# Patient Record
Sex: FEMALE | Race: BLACK OR AFRICAN AMERICAN | NOT HISPANIC OR LATINO
[De-identification: names, ages, dates, MRNs, and addresses within clinical notes are randomized per-mention and may not be internally consistent; named-entity substitution may affect disease eponyms.]

---

## 2023-01-25 PROBLEM — Z00.00 ENCOUNTER FOR PREVENTIVE HEALTH EXAMINATION: Status: ACTIVE | Noted: 2023-01-25

## 2023-01-31 ENCOUNTER — APPOINTMENT (OUTPATIENT)
Dept: OTOLARYNGOLOGY | Facility: CLINIC | Age: 28
End: 2023-01-31
Payer: COMMERCIAL

## 2023-01-31 ENCOUNTER — NON-APPOINTMENT (OUTPATIENT)
Age: 28
End: 2023-01-31

## 2023-01-31 VITALS
WEIGHT: 140 LBS | SYSTOLIC BLOOD PRESSURE: 111 MMHG | DIASTOLIC BLOOD PRESSURE: 71 MMHG | HEART RATE: 76 BPM | BODY MASS INDEX: 26.43 KG/M2 | HEIGHT: 61 IN | TEMPERATURE: 98.2 F

## 2023-01-31 DIAGNOSIS — Z78.9 OTHER SPECIFIED HEALTH STATUS: ICD-10-CM

## 2023-01-31 PROCEDURE — 31575 DIAGNOSTIC LARYNGOSCOPY: CPT

## 2023-01-31 PROCEDURE — 99204 OFFICE O/P NEW MOD 45 MIN: CPT | Mod: 25

## 2023-01-31 NOTE — DATA REVIEWED
[de-identified] : -\par CT Neck w/ Contrast 11/8/21\par  IMPRESSION:\par Large, symmetric and enhancing parotid & submandibular glands perhaps reflective of an autoimmune phenomenon and clinical correlation advise. Several moderately prominent cervical lymph node are also identified. Soft tissue prominence in nasopharynx and along tonsillar pillars also likely due to associated lymphoid hypertrophy.

## 2023-01-31 NOTE — PROCEDURE
[de-identified] : -\par Laryngoscopy: Flexible Laryngoscopy - Procedure Note\par \par Pre-operative Diagnosis: Salivary gland swelling\par Post-operative Diagnosis: normal exam \par Anesthesia: Topical - 1 % Lidocaine/Phenylephrine\par Procedure: Flexible Laryngoscopy\par \par Procedure Details: \par The patient was placed in the sitting position. After decongestant and anesthesia were applied the laryngoscope was passed. The nasal cavities, nasopharynx, oropharynx, hypopharynx, and larynx were all examined. Vocal folds were examined during respiration and phonation. The following findings were noted:\par \par Findings: \par Nose: Septum is midline, turbinates are normal, nasal airways patent, mucosa normal\par Nasopharynx: Adenoids normal, no masses, eustachian tube normal\par Oropharynx: Pharyngeal walls symmetric and without lesion. Tonsils/fossae symmetric and normal.\par Hypopharynx: Hypopharynx and pyriform sinuses without lesion. No masses or asymmetry. No pooling of secretions.\par Larynx: Epiglottis and aryepiglottic folds were sharp and crisp bilaterally. Bilateral false and true vocal folds normal appearance. Bilateral vocal folds fully mobile and symmetric. Airway was widely patent. \par \par Condition: Stable. Patient tolerated procedure well.\par \par Complications: None\par \par

## 2023-01-31 NOTE — ASSESSMENT
[FreeTextEntry1] : 28-year-old female who presents with concern for bilateral neck and mandibular swelling.  Patient previously worked up and diagnosed with sialoadenitis.  She notes that her symptoms have persisted and have been bothersome.  Previously has been worked up by both ENT and rheumatology without definitive diagnosis.  Exam today is essentially normal.  At this time I have asked the patient to send me previous CT imaging as well as rheumatologic notes and lab work.  At this time I am recommending an ultrasound of the neck for further evaluation as well as conservative treatment for sialoadenitis.  Sial adenitis handout given.  Patient to follow-up in 3 weeks for repeat evaluation\par \par – Sialadenitis handout given\par – Ultrasound neck with attention to bilateral salivary glands\par – Patient to have previous work-up sent to me for evaluation\par – Follow-up 3 weeks, sooner should symptoms worsen or fail to improve\par –consider lip biopsy

## 2023-03-18 ENCOUNTER — OUTPATIENT (OUTPATIENT)
Dept: OUTPATIENT SERVICES | Facility: HOSPITAL | Age: 28
LOS: 1 days | End: 2023-03-18

## 2023-03-18 ENCOUNTER — APPOINTMENT (OUTPATIENT)
Dept: ULTRASOUND IMAGING | Facility: CLINIC | Age: 28
End: 2023-03-18
Payer: COMMERCIAL

## 2023-03-18 PROCEDURE — 76536 US EXAM OF HEAD AND NECK: CPT | Mod: 26

## 2023-03-19 ENCOUNTER — TRANSCRIPTION ENCOUNTER (OUTPATIENT)
Age: 28
End: 2023-03-19

## 2023-03-24 ENCOUNTER — APPOINTMENT (OUTPATIENT)
Dept: OTOLARYNGOLOGY | Facility: CLINIC | Age: 28
End: 2023-03-24
Payer: COMMERCIAL

## 2023-03-24 PROCEDURE — 99214 OFFICE O/P EST MOD 30 MIN: CPT | Mod: 95

## 2023-03-31 NOTE — REASON FOR VISIT
[Home] : at home, [unfilled] , at the time of the visit. [Medical Office: ([facility])___] : at the medical office located in  [Patient] : the patient

## 2023-03-31 NOTE — ASSESSMENT
[FreeTextEntry1] : 28-year-old female who presents with concern for bilateral neck and mandibular swelling.  Patient previously worked up and diagnosed with sialoadenitis.  She notes that her symptoms have persisted and have been bothersome.  Previously has been worked up by both ENT and rheumatology without definitive diagnosis.  Exam today is essentially normal.  At this time I have asked the patient to send me previous CT imaging as well as rheumatologic notes and lab work.  At this time I am recommending an ultrasound of the neck for further evaluation as well as conservative treatment for sialoadenitis.  Sial adenitis handout given.  Patient to follow-up in 3 weeks for repeat evaluation.\par \par   3/24/2023: No significant changes.  Ultrasound neck completed without evidence of salivary stones.  Salivary glands themselves had heterogeneity.   at this point patient knows to send me work-up from allergist with further blood work.  Subsequently will likely need either salivary gland biopsy versus sialendoscopy with injection of steroid\par \par –  ultrasound reviewed\par – Patient have previous blood work and rheumatologic work-up sent to me for review\par – Consider tissue/salivary gland biopsy\par – Consider sialendoscopy with injection of steroids\par – Follow-up 1 month, sooner if symptoms worsen or fail to improve

## 2023-03-31 NOTE — HISTORY OF PRESENT ILLNESS
[de-identified] : 1/31/23\par 28F presents with bilateral neck/jaw swelling since 2021. She reports that when she eats and/or is stressed, her "glands swell up." Symptoms occur every day. Swelling can last 20-30 mins, massage and hot tea help. Associated symptoms nasal congestion. She is staying away from dairy and antihistamines because she believes they trigger this. Denies pain. Difficulty chewing due to "tightness." + dry mouth. Also reports some difficulty swallowing with these episodes. No breathing issues. No voice changes. No fevers, night sweats, weight loss or referred otalgia. \par \par Seen by ENT (last time was 2 months ago), rheum with blood work negative for rheumatologic diseases per pt. \par \par Had CT Scan done 11/2021 which was normal per pt. \par - [FreeTextEntry1] : 3/24/23\par No significant changes in symptoms.  Symptomatology including neck swelling has included especially with eating.  Patient did complete ultrasound of the neck and presents to review those results.  Patient is also been seen by an allergist and further blood work is pending.  No new ear, nose, throat symptoms otherwise.

## 2023-04-06 ENCOUNTER — APPOINTMENT (OUTPATIENT)
Dept: OTOLARYNGOLOGY | Facility: CLINIC | Age: 28
End: 2023-04-06

## 2023-04-07 ENCOUNTER — APPOINTMENT (OUTPATIENT)
Dept: OTOLARYNGOLOGY | Facility: CLINIC | Age: 28
End: 2023-04-07

## 2023-04-25 ENCOUNTER — APPOINTMENT (OUTPATIENT)
Dept: OTOLARYNGOLOGY | Facility: CLINIC | Age: 28
End: 2023-04-25
Payer: COMMERCIAL

## 2023-04-25 VITALS
BODY MASS INDEX: 26.43 KG/M2 | SYSTOLIC BLOOD PRESSURE: 110 MMHG | HEIGHT: 61 IN | HEART RATE: 70 BPM | TEMPERATURE: 98 F | DIASTOLIC BLOOD PRESSURE: 70 MMHG | WEIGHT: 140 LBS | OXYGEN SATURATION: 99 %

## 2023-04-25 PROCEDURE — 99214 OFFICE O/P EST MOD 30 MIN: CPT

## 2023-04-26 NOTE — HISTORY OF PRESENT ILLNESS
[de-identified] : 1/31/23\par 28F presents with bilateral neck/jaw swelling since 2021. She reports that when she eats and/or is stressed, her "glands swell up." Symptoms occur every day. Swelling can last 20-30 mins, massage and hot tea help. Associated symptoms nasal congestion. She is staying away from dairy and antihistamines because she believes they trigger this. Denies pain. Difficulty chewing due to "tightness." + dry mouth. Also reports some difficulty swallowing with these episodes. No breathing issues. No voice changes. No fevers, night sweats, weight loss or referred otalgia. \par \par Seen by ENT (last time was 2 months ago), rheum with blood work negative for rheumatologic diseases per pt. \par \par Had CT Scan done 11/2021 which was normal per pt. \par - \par Interval History: 3/24/23\par No significant changes in symptoms. Symptomatology including neck swelling has included especially with eating. Patient did complete ultrasound of the neck and presents to review those results. Patient is also been seen by an allergist and further blood work is pending. No new ear, nose, throat symptoms otherwise. \par - [FreeTextEntry1] : 4/25/23\par No changes in symptoms. Presents for 1 mo fu. No new ENT issues otherwise. Was seen by Allergist and work up essentially negative, same with rheumatologist.

## 2023-04-26 NOTE — ASSESSMENT
[FreeTextEntry1] : 28-year-old female who presents with concern for bilateral neck and mandibular swelling. Patient previously worked up and diagnosed with sialoadenitis. She notes that her symptoms have persisted and have been bothersome. Previously has been worked up by both ENT and rheumatology without definitive diagnosis. Exam today is essentially normal. At this time I have asked the patient to send me previous CT imaging as well as rheumatologic notes and lab work. At this time I am recommending an ultrasound of the neck for further evaluation as well as conservative treatment for sialoadenitis. Sial adenitis handout given. Patient to follow-up in 3 weeks for repeat evaluation.\par \par  3/24/2023: No significant changes. Ultrasound neck completed without evidence of salivary stones. Salivary glands themselves had heterogeneity. at this point patient knows to send me work-up from allergist with further blood work. Subsequently will likely need either salivary gland biopsy versus sialendoscopy with injection of steroid\par \par 4/25/23:  Today, patient reports no changes in symptoms. Exam unchanged. I am recommending referral to Dr. Randolph for further evaluation and management including potential sialendoscopy with steroid injection, or potential biopsy. \par \par – Patient have previous blood work and rheumatologic work-up sent to me for review\par -  Dr. Randolph referral for further evaluation

## 2023-05-26 ENCOUNTER — APPOINTMENT (OUTPATIENT)
Dept: OTOLARYNGOLOGY | Facility: CLINIC | Age: 28
End: 2023-05-26
Payer: COMMERCIAL

## 2023-05-26 VITALS
BODY MASS INDEX: 28.25 KG/M2 | WEIGHT: 149.6 LBS | HEIGHT: 61 IN | DIASTOLIC BLOOD PRESSURE: 82 MMHG | HEART RATE: 75 BPM | SYSTOLIC BLOOD PRESSURE: 117 MMHG | TEMPERATURE: 95.3 F

## 2023-05-26 DIAGNOSIS — R60.0 LOCALIZED EDEMA: ICD-10-CM

## 2023-05-26 PROCEDURE — 99214 OFFICE O/P EST MOD 30 MIN: CPT

## 2023-05-29 NOTE — ASSESSMENT
[FreeTextEntry1] : Ms. SORIA is a 28 year old woman who has chronic submandibular swelling and sialadenitis starting in 2021.\par Rheumatologic w/up in 12/2022 was negative but IgG subclasses are high normal or elevated.\par SSA/SSB equivocal,\par \par PLAN:\par lip biopsy\par Rheum consult\par Primary Care provider needed\par Sialoendoscopy will not be done at this time.\par \par

## 2023-05-29 NOTE — PHYSICAL EXAM
[Midline] : trachea located in midline position [Normal] : cranial nerves 2-12 intact [FreeTextEntry1] : No hoarseness.  [de-identified] : Enlarged, firm, NT submandibular glands bilateral. [de-identified] : Thyroid gland normal size, no palpable nodules.  [de-identified] : Dry mucosa diffuse.  No salive seen expressed in FOM. [de-identified] : Bilateral parotid glands are normal.

## 2023-05-29 NOTE — HISTORY OF PRESENT ILLNESS
[de-identified] : Ms. SORIA is a 28 year old woman who was referred by Dr. Abhilash Ch for sialoadenitis.\par \par She has bilateral neck/jaw swelling since 2021. When she eats or is stressed, her "glands swell up." Symptoms occur every day. Swelling can last 20-30 minutes; massage and hot tea help. Glands are always a little enlarged.  She gets difficulty chewing due to tightness and dry mouth.  Also reports some difficulty swallowing with these episodes. Gets dry mouth often.\par Sx started after taking Benadryl for suspected allergic reaction.\par In April 2023 she was eating salmon w/ wine sauce and ithen felt tightness and tingling in her right face which lasted 25-30 minutes.\par She saw a rheumatologist in Dec 2022, after which she was told that workup was negative.\par Gets nasal congestion.\par \par  \par CT SOFT TISSUE NECK WITH CONTRAST (11-) at [facility]\par - COMPARISON:  None\par * Cursory evaluation of the intracranial compartment reveals nothing abnormal. The fourth ventricle is midline. The cranial base intact. There is some soft tissue prominence in the nasopharynx presumably benign lymphoid hypertrophy. There is a calcification in this location as well of questionable significance. Pterygopalatine fossae are clear. The parotid and submandibular glands are unusually large and denser/more enhancing than we typically see. There is no distinct evidence of intrinsic mass lesion or associated calculi. Clinical correlation is advised regarding the possibility of an underlying autoimmune phenomenon.\par * The glottis and supraglottic region is clear. The infraglottic region, thyroid gland and upper mediastinum are unremarkable. The supraclavicular region and lung apex are unremarkable as well.\par * A few moderately prominent lymph nodes are identified in the anterior and posterior cervical spaces bilaterally. None of these are clearly above the threshold size to be considered distinctly abnormal. The largest of these are at level 1B\par IMPRESSION: \par  Large, symmetric and enhancing parotid and submandibular glands perhaps reflective of an autoimmune phenomenon and clinical correlation is advised. Several moderately prominent cervical lymph nodes are also identified. Soft tissue prominence in nasopharynx and along the tonsillar pillars also likely due to associated lymphoid hypertrophy\par (Images were reviewed.) \par \par \par US HEAD NECK SOFT TISSUE 03/18/2023 at [facility]\par - Prior study: None.\par * Right parotid gland measures 5.6 x 2.2 x 2.8 cm, homogeneous, no focal lesions. Subcentimeter benign-appearing lymph node at the lower pole.\par * Left parotid gland measures 5.9 x 1.8 x 2.5 cm, homogeneous. Subcentimeter benign-appearing lymph node at the lower pole.\par *  Right submandibular gland measures 4.3 x 1.2 x 3.3 cm, heterogeneous, normal vascularity.\par *  Left submandibular gland measures 4.9 x 1.4 x 3.2 cm, heterogeneous, normal vascularity.\par *  Bilateral subcentimeter morphologically normal mostly level 2 lymph nodes, likely reactive.\par *  No additional cystic or solid neck lesions.\par IMPRESSION:\par - Mildly heterogeneous submandibular glands, significance unclear, may be further evaluated with contrast-enhanced neck CT.\par - No suspicious lymphadenopathy or sonographically visible neck masses.\par \par \par LABS\par (10/12/2022) - C3 146 (NL), C4 32 (NL), RF  15, ANDRAE IgM/IgG NL, CRP 1.4, anti-DNA DS <1, Smith Ab <0.2, RNP Ab <0.2, SSA 0.2, SSB <0.2, ACE 35, IgG overall and subclass 1 elevated ; subclasses 2-4 high normal \par

## 2023-06-20 ENCOUNTER — APPOINTMENT (OUTPATIENT)
Dept: INTERNAL MEDICINE | Facility: CLINIC | Age: 28
End: 2023-06-20

## 2023-07-06 ENCOUNTER — APPOINTMENT (OUTPATIENT)
Dept: OTOLARYNGOLOGY | Facility: CLINIC | Age: 28
End: 2023-07-06
Payer: COMMERCIAL

## 2023-07-06 VITALS
BODY MASS INDEX: 28.13 KG/M2 | HEIGHT: 61 IN | HEART RATE: 76 BPM | SYSTOLIC BLOOD PRESSURE: 112 MMHG | WEIGHT: 149 LBS | DIASTOLIC BLOOD PRESSURE: 80 MMHG

## 2023-07-06 DIAGNOSIS — R09.81 NASAL CONGESTION: ICD-10-CM

## 2023-07-06 DIAGNOSIS — Z87.898 PERSONAL HISTORY OF OTHER SPECIFIED CONDITIONS: ICD-10-CM

## 2023-07-06 DIAGNOSIS — J34.3 HYPERTROPHY OF NASAL TURBINATES: ICD-10-CM

## 2023-07-06 DIAGNOSIS — K11.20 SIALOADENITIS, UNSPECIFIED: ICD-10-CM

## 2023-07-06 DIAGNOSIS — K11.7 DISTURBANCES OF SALIVARY SECRETION: ICD-10-CM

## 2023-07-06 PROCEDURE — 40812 EXCISE/REPAIR MOUTH LESION: CPT

## 2023-07-06 PROCEDURE — 99213 OFFICE O/P EST LOW 20 MIN: CPT | Mod: 25

## 2023-07-06 NOTE — PHYSICAL EXAM
[de-identified] : Mildly enlarged, firm, NT submandibular glands bilateral. [de-identified] : Thyroid gland normal size, no palpable nodules.  [de-identified] : inferior turbinate hypertrophy bilateral  [de-identified] : Mildly dry mucosa mouth/throat. [Normal] : no neck adenopathy

## 2023-07-06 NOTE — PROCEDURE
[FreeTextEntry1] : Biopsy of lower lip vestibule for minor salivary gland. [FreeTextEntry2] : Chronic dry mouth and sialadenitis - r/o Sjogren's, rheumatologic disease [FreeTextEntry3] : Anesthesia:  Local\par EBL:  minimal\par Complications:  None\par \par INDICATIONS:  Ms. SORIA has chronic dry eyes and mouth , with blood tests equivocal for Sjogren syndrome.   I recommended lower lip minor salivary gland biopsy.  Risks, benefits, and alternatives were discussed with the patient who agreed to proceed.  Risks include, but are not limited to, bleeding, infection, sialadenitis, mucocele and scar.\par \par PROCEDURE: After informed consent was obtained, the right lower lip mucosal area was injected with 1% lidocaine with 1:100,000 epinephrine for anesthesia and vasoconstriction. Once the area was completely anesthetized, an incision was made through the mucosa.  Dissection in the submucosa revealed a only 1 minor salivary glands, which was removed.  There was minimal bleeding, controlled with pressure. The incision was closed with 4-0 chromic interrupted simple sutures.  The specimen was placed in formalin for Pathology.\par The patient tolerated the procedure well\par

## 2023-07-06 NOTE — HISTORY OF PRESENT ILLNESS
[de-identified] : Ms. SORIA presents for minor salivary gland biopsy.\par She still gets dry mouth and submandibular gland swelling which contributes to her dysphagia. \par She has always had nasal congestion. She  used oxymetazoline nasal spray x the past 3-4 days with improvement in congestion.  She feels her swallowing is better when she is not congested. Occasionally she has facial pressure below/above her eyes.  No runny nose or postnasal drip. \par \par Ms. SORIA is a 28 year old woman who was referred by Dr. Abhilash Ch for sialoadenitis.\par \par She has bilateral neck/jaw swelling since 2021. When she eats or is stressed, her "glands swell up." Symptoms occur every day. Swelling can last 20-30 minutes; massage and hot tea help. Glands are always a little enlarged. She gets difficulty chewing due to tightness and dry mouth. Also reports some difficulty swallowing with these episodes. Gets dry mouth often.\par Sx started after taking Benadryl for suspected allergic reaction.\par In April 2023 she was eating salmon w/ wine sauce and ithen felt tightness and tingling in her right face which lasted 25-30 minutes.\par She saw a rheumatologist in Dec 2022, after which she was told that workup was negative.\par Gets nasal congestion.\par \par  \par CT SOFT TISSUE NECK WITH CONTRAST (11-) at [facility]\par - COMPARISON: None\par * Cursory evaluation of the intracranial compartment reveals nothing abnormal. The fourth ventricle is midline. The cranial base intact. There is some soft tissue prominence in the nasopharynx presumably benign lymphoid hypertrophy. There is a calcification in this location as well of questionable significance. Pterygopalatine fossae are clear. The parotid and submandibular glands are unusually large and denser/more enhancing than we typically see. There is no distinct evidence of intrinsic mass lesion or associated calculi. Clinical correlation is advised regarding the possibility of an underlying autoimmune phenomenon.\par * The glottis and supraglottic region is clear. The infraglottic region, thyroid gland and upper mediastinum are unremarkable. The supraclavicular region and lung apex are unremarkable as well.\par * A few moderately prominent lymph nodes are identified in the anterior and posterior cervical spaces bilaterally. None of these are clearly above the threshold size to be considered distinctly abnormal. The largest of these are at level 1B\par IMPRESSION: \par  Large, symmetric and enhancing parotid and submandibular glands perhaps reflective of an autoimmune phenomenon and clinical correlation is advised. Several moderately prominent cervical lymph nodes are also identified. Soft tissue prominence in nasopharynx and along the tonsillar pillars also likely due to associated lymphoid hypertrophy\par \par \par US HEAD NECK SOFT TISSUE 03/18/2023 at [facility]\par - Prior study: None.\par * Right parotid gland measures 5.6 x 2.2 x 2.8 cm, homogeneous, no focal lesions. Subcentimeter benign-appearing lymph node at the lower pole.\par * Left parotid gland measures 5.9 x 1.8 x 2.5 cm, homogeneous. Subcentimeter benign-appearing lymph node at the lower pole.\par * Right submandibular gland measures 4.3 x 1.2 x 3.3 cm, heterogeneous, normal vascularity.\par * Left submandibular gland measures 4.9 x 1.4 x 3.2 cm, heterogeneous, normal vascularity.\par * Bilateral subcentimeter morphologically normal mostly level 2 lymph nodes, likely reactive.\par * No additional cystic or solid neck lesions.\par IMPRESSION:\par - Mildly heterogeneous submandibular glands, significance unclear, may be further evaluated with contrast-enhanced neck CT.\par - No suspicious lymphadenopathy or sonographically visible neck masses.\par \par \par LABS\par (10/12/2022) - C3 146 (NL), C4 32 (NL), RF 15, ANDRAE IgM/IgG NL, CRP 1.4, anti-DNA DS <1, Smith Ab <0.2, RNP Ab <0.2, SSA 0.2, SSB <0.2, ACE 35, IgG overall and subclass 1 elevated ; subclasses 2-4 high normal \par \par

## 2023-07-06 NOTE — ASSESSMENT
[FreeTextEntry1] : Ms. SORIA has chronic dry mouth and eyes, with submandibular swelling and sialadenitis starting in 2021.\par SSA/SSB equivocal results. Lower lip biopsy was performed today.\par \par Chronic nasal congstion due to inferior turbinate hypertrophy; relieved with use of Afrin nasal spray x past 3 days.\par --> Stop Afrin\par --> fluticasone nasal spray \par \par I will inform her of results in 1 week.\par \par \par

## 2023-07-07 RX ORDER — FLUTICASONE PROPIONATE 50 UG/1
50 SPRAY, METERED NASAL DAILY
Qty: 1 | Refills: 6 | Status: ACTIVE | COMMUNITY
Start: 2023-07-07 | End: 1900-01-01

## 2023-07-18 ENCOUNTER — APPOINTMENT (OUTPATIENT)
Dept: INTERNAL MEDICINE | Facility: CLINIC | Age: 28
End: 2023-07-18

## 2023-10-11 ENCOUNTER — APPOINTMENT (OUTPATIENT)
Dept: RHEUMATOLOGY | Facility: CLINIC | Age: 28
End: 2023-10-11